# Patient Record
Sex: MALE | Race: WHITE
[De-identification: names, ages, dates, MRNs, and addresses within clinical notes are randomized per-mention and may not be internally consistent; named-entity substitution may affect disease eponyms.]

---

## 2017-05-18 ENCOUNTER — HOSPITAL ENCOUNTER (OUTPATIENT)
Dept: HOSPITAL 61 - PCVCIMAG | Age: 72
Discharge: HOME | End: 2017-05-18
Attending: INTERNAL MEDICINE
Payer: COMMERCIAL

## 2017-05-18 DIAGNOSIS — I65.23: Primary | ICD-10-CM

## 2017-05-18 DIAGNOSIS — E78.5: ICD-10-CM

## 2017-05-18 DIAGNOSIS — Z95.1: ICD-10-CM

## 2017-05-18 DIAGNOSIS — I10: ICD-10-CM

## 2017-05-18 PROCEDURE — 93005 ELECTROCARDIOGRAM TRACING: CPT

## 2017-05-18 PROCEDURE — 80061 LIPID PANEL: CPT

## 2017-05-18 PROCEDURE — 93880 EXTRACRANIAL BILAT STUDY: CPT

## 2017-05-18 PROCEDURE — G0463 HOSPITAL OUTPT CLINIC VISIT: HCPCS

## 2017-11-21 ENCOUNTER — HOSPITAL ENCOUNTER (OUTPATIENT)
Dept: HOSPITAL 61 - PCVCCLINIC | Age: 72
Discharge: HOME | End: 2017-11-21
Attending: INTERNAL MEDICINE
Payer: COMMERCIAL

## 2017-11-21 DIAGNOSIS — Z79.82: ICD-10-CM

## 2017-11-21 DIAGNOSIS — Z79.899: ICD-10-CM

## 2017-11-21 DIAGNOSIS — I35.0: ICD-10-CM

## 2017-11-21 DIAGNOSIS — E78.5: ICD-10-CM

## 2017-11-21 DIAGNOSIS — Z87.891: ICD-10-CM

## 2017-11-21 DIAGNOSIS — I73.9: ICD-10-CM

## 2017-11-21 DIAGNOSIS — I77.9: ICD-10-CM

## 2017-11-21 DIAGNOSIS — I25.810: Primary | ICD-10-CM

## 2017-11-21 DIAGNOSIS — I10: ICD-10-CM

## 2017-11-21 PROCEDURE — 80061 LIPID PANEL: CPT

## 2017-11-21 PROCEDURE — 93005 ELECTROCARDIOGRAM TRACING: CPT

## 2017-11-21 PROCEDURE — G0463 HOSPITAL OUTPT CLINIC VISIT: HCPCS

## 2018-05-21 ENCOUNTER — HOSPITAL ENCOUNTER (OUTPATIENT)
Dept: HOSPITAL 61 - PCVCCLINIC | Age: 73
End: 2018-05-21
Attending: INTERNAL MEDICINE
Payer: COMMERCIAL

## 2018-05-21 DIAGNOSIS — I10: ICD-10-CM

## 2018-05-21 DIAGNOSIS — Z79.82: ICD-10-CM

## 2018-05-21 DIAGNOSIS — I73.9: ICD-10-CM

## 2018-05-21 DIAGNOSIS — I35.0: ICD-10-CM

## 2018-05-21 DIAGNOSIS — I25.810: Primary | ICD-10-CM

## 2018-05-21 DIAGNOSIS — E78.5: ICD-10-CM

## 2018-05-21 DIAGNOSIS — Z87.891: ICD-10-CM

## 2018-05-21 DIAGNOSIS — I65.23: ICD-10-CM

## 2018-05-21 DIAGNOSIS — Z79.899: ICD-10-CM

## 2018-05-21 PROCEDURE — 93005 ELECTROCARDIOGRAM TRACING: CPT

## 2018-05-21 PROCEDURE — 80061 LIPID PANEL: CPT

## 2018-11-29 ENCOUNTER — HOSPITAL ENCOUNTER (OUTPATIENT)
Dept: HOSPITAL 61 - PCVCIMAG | Age: 73
Discharge: HOME | End: 2018-11-29
Attending: INTERNAL MEDICINE
Payer: COMMERCIAL

## 2018-11-29 DIAGNOSIS — I10: ICD-10-CM

## 2018-11-29 DIAGNOSIS — I25.10: Primary | ICD-10-CM

## 2018-11-29 DIAGNOSIS — E78.5: ICD-10-CM

## 2018-11-29 DIAGNOSIS — Z95.1: ICD-10-CM

## 2018-11-29 DIAGNOSIS — Z95.828: ICD-10-CM

## 2018-11-29 DIAGNOSIS — I73.9: ICD-10-CM

## 2018-11-29 PROCEDURE — 93325 DOPPLER ECHO COLOR FLOW MAPG: CPT

## 2018-11-29 PROCEDURE — 93351 STRESS TTE COMPLETE: CPT

## 2018-11-29 PROCEDURE — 93978 VASCULAR STUDY: CPT

## 2018-11-29 NOTE — PCVCIMAG
EXAM: AORTOILIAC DUPLEX



INDICATION: Peripheral arterial disease 



FINDINGS: 



AORTA: Suprarenal aorta measures maximum diameter of 2.5 cm. There is

not a fusiform infrarenal aortic aneurysm. The infrarenal aorta

measures maximum diameter of 2.2 cm. No aortic stenosis.



RIGHT COMMON ILIAC ARTERY: Maximum diameter is 1.1 cm. No significant

stenosis.



RIGHT EXTERNAL ILIAC ARTERY:  No significant stenosis.



LEFT COMMON ILIAC ARTERY: Maximum diameter is 1.2 cm.  No significant

stenosis.



LEFT EXTERNAL ILIAC ARTERY:  No significant stenosis.



IMPRESSION: 

No abdominal aortic aneurysm. No aortoiliac stenosis seen.

Previous bilateral iliac artery stents maintaining satisfactory

patency.





LOC:[id number]

## 2018-11-29 NOTE — PCVCIMAG
--------------- APPROVED REPORT --------------





Study performed:  11/29/2018 11:24:22



Exam:  Stress Echocardiogram

Indication: CAD s/p CABG

Patient Location: Echo lab

Stress Nurse: Esperanza Crowder RN

Status: routine



Ht: 5 ft 8 in  

HR: 88 bpm      BP: 142/70 mmHg

Rhythm: NSR



Medical History

Medical History: CAD s/p CABG, Hyperlipidemia, HTN, 

PVD



Procedure

The patient underwent an Exercise Stress Test using the Damaso 

Protocol. Blood pressure, heart rate, and EKG were monitored.

An Echocardiogram was performed by technician in four stages in quad 

fashion.  At peak stress, four selected images were obtained and 

placed side by side with resting images for comparison.



Stress Test Details

Stress Test:  Exercise stress testing was performed using a Damaso 

protocol.

HR

Resting HR:            88 bpmMax Heart Rate (APMHR): 145 bpm 

Max HR Achieved:  150 bpmTarget HR (85% APMHR): 123 bpm

% of APMHR:         103

Recovery HR:            98 bpm

HR response to stress: Normal HR response to stress



BP

Resting BP:  142/70 mmHg

Max BP:       180/70 mmHg

Recovery BP:       152/74 mmHg

BP response to stress: Normal blood pressure response to 

stress.

ECG

Resting ECG:  Sinus Rhythm

Stress ECG:     Sinus Rhythm

ST Change: Nondiagnostic ST abnormalities

Maximum ST Deviation: 0.5 mm

Arrhythmia:    VPC's

Recovery ECG: Sinus Rhythm

Recovery ST Change: Nondiagnostic ST abnormalities

Recovery ST Deviation: 0.5 mm

Recovery Arrhythmia: None



Clinical

Reason for Termination: Maximal effort

Exercise duration: 7 min 02 sec

Highest Stage Achieved: Stage 3: 3.4 mph at 14% grade. 

Exercise capacity: 10.10 METs

Overall Exercise Capacity for Age: Normal

Angina Score: None



Stress ECG Conclusion

ECG: Non-ischemic

Clinical: Non-ischemic

[facility] Treadmill Score is 4.5 which is Moderate risk.



Pre-Stress Echo

The resting Echocardiogram showed normal left ventricular 

contractility with an estimated Ejection Fraction of about &gt;55%. 

Normal wall motion in all segments on baseline images.



Post-Stress Echo

The stress Echocardiogram showed abnormal left ventricular 

contractility with an estimated Ejection Fraction of about 50%. 

Mild global left ventricular dysfunction with mild LV enlargement 

post-exercise.



Conclusion

Clinical Response:  Non-ischemic

Exercise Capacity:  Average

Stress ECG Response:  Non-ischemic

Stress Echo Images:  Ischemic

The left ventricle is normal in size and wall thickness in both the 

rest and stress images. 

Aortic valve peak gradient is 73mmHg.  Mean gradient is 45mmHg.  

Aortic valve area is 0.8cm2.

Abnormal stress echocardiogram with maximal exercise stress; 

post-stress images suggest multivessel CAD vs consequences of 

progressive aortic stenosis. 



Other Information

Study Quality: Adequate



&lt;Conclusion&gt;

The left ventricle is normal in size and wall thickness in both the 

rest and stress images. 

Aortic valve peak gradient is 73mmHg.  Mean gradient is 45mmHg.  

Aortic valve area is 0.8cm2.

Abnormal stress echocardiogram with maximal exercise stress; 

post-stress images suggest multivessel CAD vs consequences of 

progressive aortic stenosis.

## 2018-12-14 ENCOUNTER — HOSPITAL ENCOUNTER (OUTPATIENT)
Dept: HOSPITAL 61 - PCVCINTER | Age: 73
Discharge: HOME | End: 2018-12-14
Attending: INTERNAL MEDICINE
Payer: COMMERCIAL

## 2018-12-14 DIAGNOSIS — E78.00: ICD-10-CM

## 2018-12-14 DIAGNOSIS — I10: ICD-10-CM

## 2018-12-14 DIAGNOSIS — Z79.899: ICD-10-CM

## 2018-12-14 DIAGNOSIS — I25.10: ICD-10-CM

## 2018-12-14 DIAGNOSIS — Z79.82: ICD-10-CM

## 2018-12-14 DIAGNOSIS — I08.0: Primary | ICD-10-CM

## 2018-12-14 PROCEDURE — 93325 DOPPLER ECHO COLOR FLOW MAPG: CPT

## 2018-12-14 PROCEDURE — 99152 MOD SED SAME PHYS/QHP 5/>YRS: CPT

## 2018-12-14 PROCEDURE — 93312 ECHO TRANSESOPHAGEAL: CPT

## 2018-12-14 NOTE — PCVCIMAG
--------------- APPROVED REPORT --------------





Study performed:  2018 11:51:21



EXAM: Transesophageal Echocardiogram

Patient Location: Echo lab

Status:  routine



BSA:         2.00

HR: 100 bpmBP:          152/68 mmHg

Rhythm: NSR



Other Information 

Study Quality: Adequate



Indications

Aortic Valve Disease

aortic stenosis



2D Dimensions

LVOT Diam:  21.18 (18-24mm) 



Aortic Valve

AoV Peak Herve.:  3.35 m/s

AO Peak Gr.:  44.97 mmHgLVOT Max P.18 mmHg

AO Mean Gr.:  26.17 mmHgLVOT Mean P.30 mmHg

AO V2 Mean:  2.44 m/sLVOT Max V:  1.14 m/s

AO V2 VTI:  75.29 cmLVOT Mean V:  0.70 m/s

LIBAN (VTI):  1.14 [id number] V1 VTI:  24.41 cm

LIBAN Vmax: 1.20 cm2

SV (LVOT):  86.01 mL



Procedure

After obtaining informed consent, patient underwent transesophageal 

echo in the [facility]. 

Type of Sedation : Conscious Sedation

Sedation was administered by Jeanette Pickering RN. 

Sedation start time:  1153           Case end Time:  1209

Sedation was achieved intravenously with:  Versed (5 mg)    Fentanyl 

(125 mcg)    

Transesophageal probe was inserted and advanced into esophagus 

without difficulty by Richard Madrigal MD.

Echo enhancement indication: R/O Septal defect.

Echo enhancement agent administered: Agitated Saline

The LISSA was performed without complications. 

Throughout the procedure, the blood pressure, pulse oximetry, cardiac 

rhythm, and rate were monitored.

The patient tolerated the procedure without adverse effects. Recovery 

from conscious sedation was uneventful and vital signs were 

stable.



Left Ventricle

The left ventricle is normal size. There is normal LV segmental wall 

motion. There is normal left ventricular wall thickness. Left 

ventricular systolic function is normal. The left ventricular 

ejection fraction is within the normal range. LVEF is 60-65%.



Right Ventricle

The right ventricle is normal size. The right ventricular systolic 

function is normal.



Atria

The left atrium size is normal. No thrombus is visualized in the left 

atrium or appendage. Interatrial septum is intact without evidence of 

ASD or PFO. Negative bubble study. The right atrium size is 

normal.



Aortic Valve

The aortic valve is mildly calcified. Leaflet mobility does not 

support severe aortic stenosis. Mild aortic regurgitation. There is 

moderate valvular aortic stenosis. Calculated aortic valve area is 

1.2 cm2 with maximum pressure gradient of 45 mmHg and mean pressure 

gradient of 26 mmHg.



Mitral Valve

The mitral valve is normal in structure. Mild mitral regurgitation. 

No evidence of mitral valve stenosis.



Tricuspid Valve

The tricuspid valve is normal in structure. There is no tricuspid 

valve regurgitation noted.



Pulmonic Valve

The pulmonary valve is normal in structure. There is no pulmonic 

valvular regurgitation.



Great Vessels

The aortic root is normal in size. IVC is normal in size and 

collapses &gt;50% with inspiration.



Pericardium

There is no pericardial effusion.



&lt;Conclusion&gt;

Left ventricular systolic function is normal.

There is normal LV segmental wall motion.

LVEF is 60-65%.

Interatrial septum is intact without evidence of ASD or PFO. Negative 

bubble study.

No thrombus is visualized in the left atrium or appendage.

The aortic valve is mildly calcified.  Leaflet mobility does not 

support severe aortic stenosis.

There is moderate valvular aortic stenosis.  Calculated aortic valve 

area is 1.2 cm2 (maximum pressure gradient of 45 mmHg,  mean pressure 

gradient of 26 mmHg).

Mild aortic regurgitation.

There is no pericardial effusion.

## 2019-06-13 ENCOUNTER — HOSPITAL ENCOUNTER (OUTPATIENT)
Dept: HOSPITAL 61 - PCVCIMAG | Age: 74
Discharge: HOME | End: 2019-06-13
Attending: INTERNAL MEDICINE
Payer: COMMERCIAL

## 2019-06-13 DIAGNOSIS — Z87.891: ICD-10-CM

## 2019-06-13 DIAGNOSIS — I35.0: Primary | ICD-10-CM

## 2019-06-13 DIAGNOSIS — I25.810: ICD-10-CM

## 2019-06-13 PROCEDURE — 93306 TTE W/DOPPLER COMPLETE: CPT

## 2019-06-13 NOTE — PCVCIMAG
--------------- APPROVED REPORT --------------





Study performed:  2019 13:01:19



EXAM: Comprehensive 2D, Doppler, and color-flow 

Echocardiogram

Patient Location: Echo lab

Status:  routine



BSA:         1.98

HR: 67 bpmBP:          134/78 mmHg

Rhythm: NSR



Other Information 

Study Quality: Adequate



Indications

CAD

Aortic Stenosis



2D Dimensions

IVSd:  12.73 (7-11mm)LVOT Diam:  20.29 (18-24mm) 

LVDd:  39.84 mm

PWd:  10.86 (7-11mm)Ascending Ao:  30.05 (22-36mm)

LVDs:  30.81 (25-40mm)

Left Atrium:  39.97 (27-40mm)

Aortic Root:  24.51 mm

LV Single Plane 4CH:  54.25 %

LV Single Plane 2CH:  64.89 %

Biplane EF:  59.8 %



Volumes

Left Atrial Volume (Systole)

Single Plane 4CH:  43.55 mLSingle Plane 2CH:  42.27 mL

LA ESV Index:  23.00 mL/m2



Aortic Valve

AoV Peak Herve.:  4.40 m/s

AO Peak Gr.:  77.54 mmHgLVOT Max P.66 mmHg

AO Mean Gr.:  43.37 mmHgLVOT Mean P.80 mmHg

AO V2 Mean:  3.17 m/sLVOT Max V:  1.19 m/s

AO V2 VTI:  111.48 cmLVOT Mean V:  0.79 m/s

LIBAN (VTI):  0.75 [id number] V1 VTI:  25.87 cm

LIBAN Vmax: 0.87 cm2

SV (LVOT):  83.57 mL



Mitral Valve

E/A Ratio:  1.5

MV Decel. Time:  125.61 ms

MV E Max Herve.:  1.42 m/s

MV A Herve.:  0.97 m/s



TDI

E/Lateral E':  14.20E/Medial E':  20.29

Medial E' Herve.:  0.07 m/s

Lateral E' Herve.:  0.10 m/s



Pulmonary Valve

PV Peak Gr.:  4.53 mmHg



Pulmonary Vein

P Vein S:    0.70 m/sP Vein A:  0.39 m/s

P Vein D:   1.11 m/sP Vein A Dur.:  72.7 msec

P Vein S/D Ratio:  0.63



Tricuspid Valve

TR Peak Herve.:  3.00 m/s

TR Peak Gr.:  36.02 mmHg



Left Ventricle

The left ventricle is normal size. There is normal LV segmental wall 

motion. Mild concentric left ventricular hypertrophy. Left 

ventricular systolic function is normal. The left ventricular 

ejection fraction is within the normal range. LVEF is 55-60%. The 

left ventricular diastolic function is normal.



Right Ventricle

The right ventricle is normal size. The right ventricular systolic 

function is normal.



Atria

The left atrium size is normal. The right atrium size is 

normal.



Aortic Valve

Aortic valve leaflets are moderately calcified Trace aortic 

regurgitation. Peak Aortic gradient is 78mmHg. Mean gradient is 

44mmHg. 



Mitral Valve

Mild mitral annular calcification There is no mitral valve 

regurgitation noted. No evidence of mitral valve stenosis.



Tricuspid Valve

The tricuspid valve is normal in structure. Trace to mild tricuspid 

regurgitation. Pulmonary artery pressure is 43mmHg.



Pulmonic Valve

The pulmonary valve is normal in structure. There is no pulmonic 

valvular regurgitation.



Great Vessels

The aortic root is normal in size. IVC is normal in size and 

collapses >50% with inspiration.



Pericardium

There is no pericardial effusion.



<Conclusion>

Left ventricular systolic function is normal.

There is normal LV segmental wall motion.

LVEF is 55-60%.

Aortic valve leaflets are moderately calcified, moderate to severe 

stenosis

Peak Aortic gradient is 78mmHg. Mean gradient is 44mmHg.

Trace aortic regurgitation.

Mild mitral annular calcification. No mitral valve regurgitation 

noted.

Trace to mild tricuspid regurgitation. Pulmonary artery pressure of 

43mmHg.

There is no pericardial effusion.